# Patient Record
Sex: MALE | Race: WHITE | NOT HISPANIC OR LATINO | Employment: FULL TIME | URBAN - METROPOLITAN AREA
[De-identification: names, ages, dates, MRNs, and addresses within clinical notes are randomized per-mention and may not be internally consistent; named-entity substitution may affect disease eponyms.]

---

## 2023-05-12 ENCOUNTER — OFFICE VISIT (OUTPATIENT)
Dept: PRIMARY CARE | Facility: CLINIC | Age: 28
End: 2023-05-12
Payer: COMMERCIAL

## 2023-05-12 VITALS
BODY MASS INDEX: 25.06 KG/M2 | DIASTOLIC BLOOD PRESSURE: 70 MMHG | SYSTOLIC BLOOD PRESSURE: 130 MMHG | WEIGHT: 185 LBS | HEIGHT: 72 IN | OXYGEN SATURATION: 96 % | TEMPERATURE: 98.1 F | HEART RATE: 88 BPM

## 2023-05-12 DIAGNOSIS — H10.9 BACTERIAL CONJUNCTIVITIS OF BOTH EYES: Primary | ICD-10-CM

## 2023-05-12 DIAGNOSIS — B96.89 BACTERIAL CONJUNCTIVITIS OF BOTH EYES: Primary | ICD-10-CM

## 2023-05-12 PROCEDURE — 99203 OFFICE O/P NEW LOW 30 MIN: CPT

## 2023-05-12 RX ORDER — CIPROFLOXACIN HYDROCHLORIDE 3 MG/ML
1 SOLUTION/ DROPS OPHTHALMIC 4 TIMES DAILY
Qty: 1.4 ML | Refills: 0 | Status: SHIPPED | OUTPATIENT
Start: 2023-05-12 | End: 2023-05-19

## 2023-05-12 NOTE — PROGRESS NOTES
Subjective   Tong Edouard is a 27 y.o. male who presents for Eye drainage (For about a month ) and Sinusitis (For about a month ).  Sinusitis      Pt is coming in for discharge of both eyes that started 4 weeks ago. Pt has been having pale white discharge constantly, every day, since then. Denies any inciting trauma or foreign bodies that he can remember. His coworkers don't wash their hands and they are out with pink eye. Denies any blurring of the vision. It is worse in the morning and he wakes up with his eyes crusted together. Denies any fevers. Denies any other symptoms. He has had pink eye before and this feels like it to him.    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 Denies sexual activity  Meds: none  Surghx: none  Review of Systems   All other systems reviewed and are negative.      Objective   Physical Exam  Vitals reviewed.   Constitutional:       Appearance: Normal appearance.   HENT:      Head: Normocephalic and atraumatic.      Right Ear: External ear normal.      Left Ear: External ear normal.      Nose: Nose normal.      Mouth/Throat:      Mouth: Mucous membranes are moist.   Eyes:      Comments: Erythematous inner eyelids, no noticed drainage at this time.   Cardiovascular:      Rate and Rhythm: Normal rate and regular rhythm.   Pulmonary:      Effort: Pulmonary effort is normal.      Breath sounds: Normal breath sounds.   Musculoskeletal:         General: Normal range of motion.      Cervical back: Normal range of motion and neck supple.   Neurological:      General: No focal deficit present.      Mental Status: He is alert and oriented to person, place, and time.   Psychiatric:         Mood and Affect: Mood normal.         Behavior: Behavior normal.         Assessment/Plan   Problem List Items Addressed This Visit          Infectious/Inflammatory    Bacterial conjunctivitis of both eyes - Primary     Pt coming in with 4 weeks  of eye drainage daily, constantly. Pt denies any other sinus symptoms or upper respiratory issues. Pt denies associated itching. Pt likely having bacterial conjunctivitis of both eyes. Pt to start cipro eye drops 1 drop 4 times daily for 7 days. Pt to follow up if symptoms do not resolve for further evaluation.         Relevant Medications    ciprofloxacin (Ciloxan) 0.3 % ophthalmic solution

## 2023-05-12 NOTE — PROGRESS NOTES
I reviewed with the resident the medical history and the resident’s findings on physical examination.  I discussed with the resident the patient’s diagnosis and concur with the treatment plan as documented in the resident note.     Tarun Mei, DO

## 2023-05-12 NOTE — ASSESSMENT & PLAN NOTE
Pt coming in with 4 weeks of eye drainage daily, constantly. Pt denies any other sinus symptoms or upper respiratory issues. Pt denies associated itching. Pt likely having bacterial conjunctivitis of both eyes. Pt to start cipro eye drops 1 drop 4 times daily for 7 days. Pt to follow up if symptoms do not resolve for further evaluation.

## 2023-05-19 ENCOUNTER — OFFICE VISIT (OUTPATIENT)
Dept: PRIMARY CARE | Facility: CLINIC | Age: 28
End: 2023-05-19
Payer: COMMERCIAL

## 2023-05-19 VITALS
DIASTOLIC BLOOD PRESSURE: 82 MMHG | RESPIRATION RATE: 18 BRPM | SYSTOLIC BLOOD PRESSURE: 133 MMHG | HEART RATE: 85 BPM | WEIGHT: 186 LBS | TEMPERATURE: 98.8 F | OXYGEN SATURATION: 98 % | BODY MASS INDEX: 25.19 KG/M2 | HEIGHT: 72 IN

## 2023-05-19 DIAGNOSIS — J30.9 ALLERGIC CONJUNCTIVITIS OF BOTH EYES AND RHINITIS: Primary | ICD-10-CM

## 2023-05-19 DIAGNOSIS — H10.13 ALLERGIC CONJUNCTIVITIS OF BOTH EYES AND RHINITIS: Primary | ICD-10-CM

## 2023-05-19 PROCEDURE — 1036F TOBACCO NON-USER: CPT

## 2023-05-19 PROCEDURE — 99213 OFFICE O/P EST LOW 20 MIN: CPT

## 2023-05-19 RX ORDER — MINERAL OIL
180 ENEMA (ML) RECTAL DAILY
Qty: 30 TABLET | Refills: 5 | Status: SHIPPED | OUTPATIENT
Start: 2023-05-19 | End: 2024-04-11 | Stop reason: SDUPTHER

## 2023-05-19 RX ORDER — OLOPATADINE HYDROCHLORIDE 1 MG/ML
1 SOLUTION/ DROPS OPHTHALMIC 2 TIMES DAILY PRN
Qty: 5 ML | Refills: 0 | Status: SHIPPED | OUTPATIENT
Start: 2023-05-19 | End: 2024-04-11 | Stop reason: ALTCHOICE

## 2023-05-19 NOTE — ASSESSMENT & PLAN NOTE
Pt complaining of continued crusting in the morning, drainage, and new itching of his BL eyes. Pt also complaining of rhinorrhea and congestion. Denies any new environmental changes. Denies any foreign body sensation or dec in visual acuity so low suspicion for keratoconjunctivitis. Pt does not wear contacts. Pt likely experiencing symptoms 2/2 allergic conjunctivitis given symptoms. Pt to benefit from patanol eye drops BID and allegra daily. Pt instructed to call back if symptoms do not have some resolution.

## 2023-05-19 NOTE — PROGRESS NOTES
I reviewed with the resident the medical history and the resident’s findings on physical examination.  I discussed with the resident the patient’s diagnosis and concur with the treatment plan as documented in the resident note.     Dominic Aponte MD

## 2023-05-19 NOTE — PROGRESS NOTES
Subjective   Jas Edouard is a 27 y.o. male who presents for Eye Drainage (He has had sx x 1 month. He was seen last week.  Not better).  HPI  Pt is coming in today for evaluation of continued eye drainage and crusting in the morning. The drainage is still pussy and thick white, happening about twice a day. He claims this builds up in the corner and he has to pick it out. He has also now complaining of congestion. Denies any cough or any other Upper respiratory symptoms. He has never had issues with seasonal allergies however he notes that his eyes are itching him now. Denies any fevers, chills, N/V, or any other symptoms. Denies any pain anywhere. Does not where contacts. Denies any foreign body sensation or visualization of floaters. Denies any dec in visual acuity.  Denies any environmental or situational changes. No new jobs or animals.    Review of Systems    Objective   Physical Exam    Assessment/Plan   Problem List Items Addressed This Visit          Other    Allergic conjunctivitis of both eyes and rhinitis - Primary     Pt complaining of continued crusting in the morning, drainage, and new itching of his BL eyes. Pt also complaining of rhinorrhea and congestion. Denies any new environmental changes. Denies any foreign body sensation or dec in visual acuity so low suspicion for keratoconjunctivitis. Pt does not wear contacts. Pt likely experiencing symptoms 2/2 allergic conjunctivitis given symptoms. Pt to benefit from patanol eye drops BID and allegra daily. Pt instructed to call back if symptoms do not have some resolution.         Relevant Medications    olopatadine (Patanol) 0.1 % ophthalmic solution    fexofenadine (Allegra) 180 mg tablet

## 2023-06-06 DIAGNOSIS — J30.9 ALLERGIC CONJUNCTIVITIS OF BOTH EYES AND RHINITIS: Primary | ICD-10-CM

## 2023-06-06 DIAGNOSIS — H57.89 EYE DRAINAGE: ICD-10-CM

## 2023-06-06 DIAGNOSIS — H10.13 ALLERGIC CONJUNCTIVITIS OF BOTH EYES AND RHINITIS: Primary | ICD-10-CM

## 2024-03-07 ENCOUNTER — OFFICE VISIT (OUTPATIENT)
Dept: OPHTHALMOLOGY | Facility: CLINIC | Age: 29
End: 2024-03-07
Payer: COMMERCIAL

## 2024-03-07 DIAGNOSIS — H04.123 DRY EYE SYNDROME OF BOTH EYES: ICD-10-CM

## 2024-03-07 DIAGNOSIS — H02.883 MEIBOMIAN GLAND DISEASE OF RIGHT EYE: Primary | ICD-10-CM

## 2024-03-07 DIAGNOSIS — H52.01 HYPEROPIA OF RIGHT EYE WITH ASTIGMATISM: ICD-10-CM

## 2024-03-07 DIAGNOSIS — H52.201 HYPEROPIA OF RIGHT EYE WITH ASTIGMATISM: ICD-10-CM

## 2024-03-07 DIAGNOSIS — H01.02A SQUAMOUS BLEPHARITIS OF UPPER AND LOWER EYELIDS OF BOTH EYES: ICD-10-CM

## 2024-03-07 DIAGNOSIS — H01.02B SQUAMOUS BLEPHARITIS OF UPPER AND LOWER EYELIDS OF BOTH EYES: ICD-10-CM

## 2024-03-07 DIAGNOSIS — H02.889 MEIBOMIAN GLAND DYSFUNCTION: ICD-10-CM

## 2024-03-07 PROCEDURE — 92015 DETERMINE REFRACTIVE STATE: CPT | Performed by: STUDENT IN AN ORGANIZED HEALTH CARE EDUCATION/TRAINING PROGRAM

## 2024-03-07 PROCEDURE — 92014 COMPRE OPH EXAM EST PT 1/>: CPT | Performed by: STUDENT IN AN ORGANIZED HEALTH CARE EDUCATION/TRAINING PROGRAM

## 2024-03-07 RX ORDER — PERFLUOROHEXYLOCTANE 1 MG/MG
1 SOLUTION OPHTHALMIC 4 TIMES DAILY
Qty: 3 ML | Refills: 2 | Status: SHIPPED | OUTPATIENT
Start: 2024-03-07 | End: 2024-04-11 | Stop reason: ALTCHOICE

## 2024-03-07 ASSESSMENT — CONF VISUAL FIELD
OS_SUPERIOR_NASAL_RESTRICTION: 0
OD_NORMAL: 1
METHOD: COUNTING FINGERS
OS_NORMAL: 1
OD_SUPERIOR_NASAL_RESTRICTION: 0
OS_INFERIOR_TEMPORAL_RESTRICTION: 0
OS_SUPERIOR_TEMPORAL_RESTRICTION: 0
OD_SUPERIOR_TEMPORAL_RESTRICTION: 0
OD_INFERIOR_TEMPORAL_RESTRICTION: 0
OD_INFERIOR_NASAL_RESTRICTION: 0
OS_INFERIOR_NASAL_RESTRICTION: 0

## 2024-03-07 ASSESSMENT — VISUAL ACUITY
METHOD: SNELLEN - LINEAR
OD_SC: 20/20
OS_SC: 20/20-

## 2024-03-07 ASSESSMENT — TONOMETRY
OD_IOP_MMHG: 13
IOP_METHOD: GOLDMANN APPLANATION
OS_IOP_MMHG: 17

## 2024-03-07 ASSESSMENT — ENCOUNTER SYMPTOMS
HEMATOLOGIC/LYMPHATIC NEGATIVE: 0
RESPIRATORY NEGATIVE: 0
GASTROINTESTINAL NEGATIVE: 0
ALLERGIC/IMMUNOLOGIC NEGATIVE: 0
MUSCULOSKELETAL NEGATIVE: 0
CARDIOVASCULAR NEGATIVE: 0
CONSTITUTIONAL NEGATIVE: 0
PSYCHIATRIC NEGATIVE: 0
ENDOCRINE NEGATIVE: 0
NEUROLOGICAL NEGATIVE: 0
EYES NEGATIVE: 0

## 2024-03-07 ASSESSMENT — REFRACTION_MANIFEST
OD_CYLINDER: -0.50
OD_SPHERE: +0.25
OS_SPHERE: PLANO
OD_CYLINDER: -0.50
OD_SPHERE: +0.25
OD_AXIS: 170
OS_SPHERE: -0.50
METHOD_AUTOREFRACTION: 1
OD_AXIS: 169

## 2024-03-07 ASSESSMENT — EXTERNAL EXAM - RIGHT EYE: OD_EXAM: NORMAL

## 2024-03-07 ASSESSMENT — EXTERNAL EXAM - LEFT EYE: OS_EXAM: NORMAL

## 2024-03-07 ASSESSMENT — CUP TO DISC RATIO
OS_RATIO: .55
OD_RATIO: .50

## 2024-03-07 NOTE — PROGRESS NOTES
Assessment/Plan   Diagnoses and all orders for this visit:  Meibomian gland dysfunction  Squamous blepharitis of upper and lower eyelids of both eyes    Slightly asymmetric IOP os>od (held lid os), wnl ou. Larger cds however healthy NRR and larger nerves overall. Will monitor 1 year.    Testing to evaluate the presence of dry eye disease (DED) was performed today and provided the following results:  Tear break up time (TBUT), a measure of tear stability (0-5 = severe, 6-10 = moderate, 11-15 = mild)  OD:  6 seconds  OS:  6 seconds  Corneal punctate epithelial erosions (PEE) staining with sodium fluorescein score (5 zones, each PEE level 0-3, maximum score = 15)  OD: NIH PEE score:  0 Central PEE absent  OS: NIH PEE score:  0 Central PEE absent   Meibomian gland disease (Efron scale 0-4, 0: no abnormality, 4: thick creamy yellow expression at all gland orifices, expression continuous, conjunctival redness):  OD: 1  OS: 1    Patient seeing improvement in symptoms however signs are still apparent.   Tx plan: Continue preservative free artificial tears bid-qid ou, continue Cliradex foam BID and warm compresses at bedtime. Discussed options with patient. Start meibo 4x/day.     Recommend starting Miebo (perfluorohexyloctane) qid OU for evaporative dry eye. The patient has tried and failed alternative treatments of artificial tears (lipid based) and warm compresses. This prescription is being prescribed for an FDA approved reason.   Rx sent to [facility] ([facility]).       Hyperopia of right eye with astigmatism  New spec rx released today per patient request. Discussed glasses are not necessary as minimal prescription and visual acuity (VA) uncorrected is od: 20/20, os: 20/20-. Refraction billed today.     RTC 6 mo follow up

## 2024-03-29 ENCOUNTER — OFFICE VISIT (OUTPATIENT)
Dept: PRIMARY CARE | Facility: CLINIC | Age: 29
End: 2024-03-29
Payer: COMMERCIAL

## 2024-03-29 VITALS
BODY MASS INDEX: 24.38 KG/M2 | HEART RATE: 84 BPM | SYSTOLIC BLOOD PRESSURE: 144 MMHG | RESPIRATION RATE: 20 BRPM | TEMPERATURE: 97.8 F | OXYGEN SATURATION: 96 % | DIASTOLIC BLOOD PRESSURE: 82 MMHG | WEIGHT: 180 LBS | HEIGHT: 72 IN

## 2024-03-29 DIAGNOSIS — F41.9 ANXIETY: ICD-10-CM

## 2024-03-29 DIAGNOSIS — F20.89 OTHER SCHIZOPHRENIA (MULTI): Primary | ICD-10-CM

## 2024-03-29 DIAGNOSIS — J01.00 ACUTE MAXILLARY SINUSITIS, RECURRENCE NOT SPECIFIED: ICD-10-CM

## 2024-03-29 PROCEDURE — 1036F TOBACCO NON-USER: CPT

## 2024-03-29 PROCEDURE — 99214 OFFICE O/P EST MOD 30 MIN: CPT

## 2024-03-29 RX ORDER — HYDROXYZINE HYDROCHLORIDE 25 MG/1
1 TABLET, FILM COATED ORAL 3 TIMES DAILY PRN
COMMUNITY
Start: 2024-03-25 | End: 2024-05-23 | Stop reason: WASHOUT

## 2024-03-29 RX ORDER — OLANZAPINE 10 MG/1
TABLET, ORALLY DISINTEGRATING ORAL
COMMUNITY
Start: 2024-03-25 | End: 2024-05-23 | Stop reason: WASHOUT

## 2024-03-29 RX ORDER — OLANZAPINE 20 MG/1
1 TABLET ORAL NIGHTLY
COMMUNITY
Start: 2024-03-25 | End: 2024-04-24

## 2024-03-29 RX ORDER — IPRATROPIUM BROMIDE 21 UG/1
2 SPRAY, METERED NASAL EVERY 12 HOURS
Qty: 30 ML | Refills: 12 | Status: SHIPPED | OUTPATIENT
Start: 2024-03-29 | End: 2024-04-22

## 2024-03-29 RX ORDER — AMOXICILLIN AND CLAVULANATE POTASSIUM 875; 125 MG/1; MG/1
875 TABLET, FILM COATED ORAL 2 TIMES DAILY
Qty: 20 TABLET | Refills: 0 | Status: SHIPPED | OUTPATIENT
Start: 2024-03-29 | End: 2024-04-11 | Stop reason: ALTCHOICE

## 2024-03-29 ASSESSMENT — ENCOUNTER SYMPTOMS
SINUS PAIN: 1
DIZZINESS: 0
CONFUSION: 0
AGITATION: 0
DYSPHORIC MOOD: 0
NAUSEA: 0
FEVER: 0
SHORTNESS OF BREATH: 0
LIGHT-HEADEDNESS: 0
SINUS PRESSURE: 1
VOMITING: 0
HALLUCINATIONS: 0
RHINORRHEA: 1

## 2024-03-29 NOTE — ASSESSMENT & PLAN NOTE
Pt has had symptoms consistent with sinus infection for about 2 weeks. He has ttp at maxillary sinus. Pt given augmentin and atrovent intranasal spray to use. Pt to follow up if symptoms fail to improve. Pt also believes he has deviated septum, ENT referral offered however deferred at this time.

## 2024-03-29 NOTE — ASSESSMENT & PLAN NOTE
Pt recently discharged from hospital for acute psychosis 2/2 schizophrenia. Pt has been stabilized on his current dosing of zyprexa of 5mg AM, 5mg 3:30pm and 20mg nightly. Pt also using atarax as needed for anxiety and feels it is doing well for him. Pt claims he feels like he is getting back to doing the things he enjoys. He does have a therapy appt Monday and one in april that are coming up but does not have a psychiatrist. He was given a referral for one today. He said he does not currently need refills on his medications. Pt instructed on safety and knows to call 911, crisis hotline, or to seek further care should he develop any thoughts of harming self or others or his symptoms recur.

## 2024-03-29 NOTE — PROGRESS NOTES
"Patient: Tong Edouard \"Jas\"  : 1995  PCP: Vinny Gamboa DO  MRN: [id number]  Program: No programs to display       Tong Edouard \"Jas\" is a 28 y.o. male presenting today for follow-up after being discharged from the hospital 5 days ago. The main problem requiring admission was acute psychosis likely 2/2 schizophrenia. The discharge summary was reviewed.     Pt claims he had his inpatient stay at [facility] and he felt like it was very effective for him. He also noticed his sinuses have been acting out for him for quite some time with excess congestion, throat clearing. He went to the urgent care for this before his hospital admission and they told him he had a sinus infection and he should take OTC medications. He has had worsening symptoms since then.     In terms of his hospital stay. He had a panic attack likely caused by his increased stress at work. He had to put together a large shipment and things were not being communicated well which led to his heightening symptoms. He is overall happy with his job. He does have some concern in the back of his head but overall feels well. He is on zyprexa still and feels like it is working well and he does not have any anxiety although talking about work is increasing his anxiety. He does have a therapy appt Monday and another one in April. Pt is open to being set up with a psychiatrist. He feels like communication does help him. Pt will be enjoying his hobbies again soon. He is going back to [place], enjoying his walk in nature, and mowing the lawn. He feels like he sometimes needs something for anxiety and atarax takes the edge off. He is enjoying [employer] music and is going to a concert. He is going with friends. He enjoyed going to [place] with them last time for memorial day. Pt denies any thoughts of self harm or harming others.    Pt was also interested in a back brace for lifting at work. 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               Review of Systems   Constitutional:  Negative for fever.   HENT:  Positive for rhinorrhea, sinus pressure and sinus pain.    Respiratory:  Negative for shortness of breath.    Cardiovascular:  Negative for chest pain.   Gastrointestinal:  Negative for nausea and vomiting.   Neurological:  Negative for dizziness and light-headedness.   Psychiatric/Behavioral:  Negative for agitation, confusion, dysphoric mood, hallucinations, self-injury and suicidal ideas.    All other systems reviewed and are negative.      /82 (BP Location: Right arm, Patient Position: Sitting)   Pulse 84   Temp 36.6 °C (97.8 °F)   Resp 20   Ht 1.829 m (6')   Wt 81.6 kg (180 lb)   SpO2 96%   BMI 24.41 kg/m²     Physical Exam  Vitals reviewed.   Constitutional:       General: He is not in acute distress.     Appearance: Normal appearance. He is not toxic-appearing.   HENT:      Head: Normocephalic and atraumatic.      Nose: Nose normal.   Eyes:      Extraocular Movements: Extraocular movements intact.   Cardiovascular:      Rate and Rhythm: Normal rate and regular rhythm.      Heart sounds: No murmur heard.     No friction rub. No gallop.   Pulmonary:      Effort: Pulmonary effort is normal. No respiratory distress.      Breath sounds: Normal breath sounds. No wheezing, rhonchi or rales.   Skin:     General: Skin is warm and dry.   Neurological:      General: No focal deficit present.      Mental Status: He is alert and oriented to person, place, and time. Mental status is at baseline.   Psychiatric:         Mood and Affect: Mood normal.         Behavior: Behavior normal.         Thought Content: Thought content normal.           Assessment/Plan   Problem List Items Addressed This Visit             ICD-10-CM    Other schizophrenia (CMS/[facility]) - Primary F20.89     Pt recently discharged from hospital for acute psychosis 2/2 schizophrenia. Pt has been stabilized on his current dosing of  zyprexa of 5mg AM, 5mg 3:30pm and 20mg nightly. Pt also using atarax as needed for anxiety and feels it is doing well for him. Pt claims he feels like he is getting back to doing the things he enjoys. He does have a therapy appt Monday and one in april that are coming up but does not have a psychiatrist. He was given a referral for one today. He said he does not currently need refills on his medications. Pt instructed on safety and knows to call 911, crisis hotline, or to seek further care should he develop any thoughts of harming self or others or his symptoms recur.         Relevant Orders    Referral to Psychiatry    Acute maxillary sinusitis J01.00     Pt has had symptoms consistent with sinus infection for about 2 weeks. He has ttp at maxillary sinus. Pt given augmentin and atrovent intranasal spray to use. Pt to follow up if symptoms fail to improve. Pt also believes he has deviated septum, ENT referral offered however deferred at this time.          Relevant Medications    amoxicillin-pot clavulanate (Augmentin) 875-125 mg tablet    ipratropium (Atrovent) 21 mcg (0.03 %) nasal spray     Other Visit Diagnoses         Codes    Anxiety     F41.9    Relevant Orders    Referral to Psychiatry

## 2024-03-29 NOTE — PROGRESS NOTES
I reviewed the resident/fellow's documentation and discussed the patient with the resident/fellow. I agree with the resident/fellow's medical decision making as documented in the note.     Dominic Aponte MD

## 2024-03-29 NOTE — LETTER
March 29, 2024     Patient: Tong Edouard   YOB: 1995   Date of Visit: 3/29/2024       To Whom It May Concern:    Tong Edouard was seen in my clinic on 3/29/2024 at 9:50 am. Pt is to begin using a supportive back brace throughout the day at work for lifting.     If you have any questions or concerns, please don't hesitate to call.         Sincerely,         Vinny Gamboa DO        CC: No Recipients

## 2024-04-03 ENCOUNTER — TELEPHONE (OUTPATIENT)
Dept: PRIMARY CARE | Facility: CLINIC | Age: 29
End: 2024-04-03
Payer: COMMERCIAL

## 2024-04-11 ENCOUNTER — OFFICE VISIT (OUTPATIENT)
Dept: PRIMARY CARE | Facility: CLINIC | Age: 29
End: 2024-04-11
Payer: COMMERCIAL

## 2024-04-11 VITALS
RESPIRATION RATE: 20 BRPM | DIASTOLIC BLOOD PRESSURE: 81 MMHG | SYSTOLIC BLOOD PRESSURE: 124 MMHG | TEMPERATURE: 98.5 F | HEART RATE: 87 BPM | OXYGEN SATURATION: 98 % | HEIGHT: 72 IN | BODY MASS INDEX: 23.84 KG/M2 | WEIGHT: 176 LBS

## 2024-04-11 DIAGNOSIS — J30.9 ALLERGIC CONJUNCTIVITIS OF BOTH EYES AND RHINITIS: ICD-10-CM

## 2024-04-11 DIAGNOSIS — H93.8X9 SENSATION OF FULLNESS IN EAR, UNSPECIFIED LATERALITY: ICD-10-CM

## 2024-04-11 DIAGNOSIS — F41.9 ANXIETY: Primary | ICD-10-CM

## 2024-04-11 DIAGNOSIS — H10.13 ALLERGIC CONJUNCTIVITIS OF BOTH EYES AND RHINITIS: ICD-10-CM

## 2024-04-11 PROCEDURE — 99213 OFFICE O/P EST LOW 20 MIN: CPT

## 2024-04-11 PROCEDURE — 1036F TOBACCO NON-USER: CPT

## 2024-04-11 RX ORDER — MINERAL OIL
180 ENEMA (ML) RECTAL DAILY
Qty: 30 TABLET | Refills: 5 | Status: SHIPPED | OUTPATIENT
Start: 2024-04-11 | End: 2024-10-08

## 2024-04-11 ASSESSMENT — ENCOUNTER SYMPTOMS
DIZZINESS: 0
SHORTNESS OF BREATH: 0
NAUSEA: 0
LIGHT-HEADEDNESS: 0
FEVER: 0
VOMITING: 0

## 2024-04-11 NOTE — PROGRESS NOTES
"Rubin Edouard \"Jas\" is a 28 y.o. male who presents for Earache (Right ear pain x 1 week. ).  Pt presenting for concerns of an event that happened this morning. He was thinking about his work and was thinking if it was right for him. He felt intense anxiety with difficulty breathing for 5 minutes. He took an atarax which did help with his symptoms. He also was using q-tips to clean out his ear. He felt pain with the q-tip when he was pushing into his R ear. He was concerned for his ear being clogged. No other symptoms at this time.     Earache   Pertinent negatives include no vomiting.       Review of Systems   Constitutional:  Negative for fever.   HENT:  Positive for ear pain.    Respiratory:  Negative for shortness of breath.    Cardiovascular:  Negative for chest pain.   Gastrointestinal:  Negative for nausea and vomiting.   Neurological:  Negative for dizziness and light-headedness.   All other systems reviewed and are negative.      Objective   Physical Exam  Vitals reviewed.   Constitutional:       General: He is not in acute distress.     Appearance: Normal appearance. He is not toxic-appearing.   HENT:      Head: Normocephalic and atraumatic.      Right Ear: Tympanic membrane normal.      Left Ear: Tympanic membrane normal.      Nose: Nose normal.   Eyes:      Extraocular Movements: Extraocular movements intact.   Cardiovascular:      Rate and Rhythm: Normal rate and regular rhythm.      Heart sounds: No murmur heard.     No friction rub. No gallop.   Pulmonary:      Effort: Pulmonary effort is normal. No respiratory distress.      Breath sounds: Normal breath sounds. No wheezing, rhonchi or rales.   Skin:     General: Skin is warm and dry.   Neurological:      General: No focal deficit present.      Mental Status: He is alert.   Psychiatric:         Mood and Affect: Mood normal.         Behavior: Behavior normal.         Assessment/Plan   Problem List Items Addressed This Visit             " ICD-10-CM    Sensation of fullness in ear H93.8X9     Pt presenting for ear fullness BL. PE unremarkable which is reassuring. Pt does endorse seasonal allergies and has not been taking his medication daily. Pt to continue with his allegra daily and should continue flonase nightly. Pt to follow up in 2 weeks should symptoms fail to improve.          Anxiety - Primary F41.9     Pt had panic attack in the morning due to nature of work and stress it causes. Denies any accompanying hallucinations. Pt symptoms did resolve with atarax. Pt to continue with coping mechanisms. If symptoms worsen or he gets more frequent attacks pt to follow up. He does have follow up in one month otherwise.         Relevant Orders    Follow Up In [facility] - PCP - Established            Vinny Gamboa,

## 2024-04-11 NOTE — ASSESSMENT & PLAN NOTE
Pt had panic attack in the morning due to nature of work and stress it causes. Denies any accompanying hallucinations. Pt symptoms did resolve with atarax. Pt to continue with coping mechanisms. If symptoms worsen or he gets more frequent attacks pt to follow up. He does have follow up in one month otherwise.

## 2024-04-11 NOTE — LETTER
April 11, 2024     Patient: Tong Edouard   YOB: 1995   Date of Visit: 4/11/2024       To Whom It May Concern:    Tong Edouard was seen in my clinic on 4/11/2024 at 1:40 pm. Please excuse Tong for his absence from work on this day to make the appointment and for his inpatient stay from 3/11/24-3/25/24 for his management of his acute medical condition.    If you have any questions or concerns, please don't hesitate to call.         Sincerely,         Vinny Gamboa DO        CC: No Recipients

## 2024-04-11 NOTE — ASSESSMENT & PLAN NOTE
Pt presenting for ear fullness BL. PE unremarkable which is reassuring. Pt does endorse seasonal allergies and has not been taking his medication daily. Pt to continue with his allegra daily and should continue flonase nightly. Pt to follow up in 2 weeks should symptoms fail to improve.

## 2024-04-16 NOTE — PROGRESS NOTES
I reviewed the resident/fellow's documentation and discussed the patient with the resident/fellow. I agree with the resident/fellow's medical decision making as documented in the note.     Perla Nina MD

## 2024-04-20 DIAGNOSIS — J01.00 ACUTE MAXILLARY SINUSITIS, RECURRENCE NOT SPECIFIED: ICD-10-CM

## 2024-04-22 RX ORDER — IPRATROPIUM BROMIDE 21 UG/1
2 SPRAY, METERED NASAL EVERY 12 HOURS
Qty: 30 ML | Refills: 5 | Status: SHIPPED | OUTPATIENT
Start: 2024-04-22 | End: 2025-04-22

## 2024-05-09 ENCOUNTER — APPOINTMENT (OUTPATIENT)
Dept: PRIMARY CARE | Facility: CLINIC | Age: 29
End: 2024-05-09
Payer: COMMERCIAL

## 2024-05-23 ENCOUNTER — OFFICE VISIT (OUTPATIENT)
Dept: PRIMARY CARE | Facility: CLINIC | Age: 29
End: 2024-05-23
Payer: COMMERCIAL

## 2024-05-23 VITALS
RESPIRATION RATE: 16 BRPM | WEIGHT: 178 LBS | BODY MASS INDEX: 24.14 KG/M2 | HEART RATE: 69 BPM | SYSTOLIC BLOOD PRESSURE: 113 MMHG | OXYGEN SATURATION: 96 % | DIASTOLIC BLOOD PRESSURE: 68 MMHG | TEMPERATURE: 99 F

## 2024-05-23 DIAGNOSIS — F20.89 OTHER SCHIZOPHRENIA (MULTI): Primary | ICD-10-CM

## 2024-05-23 PROCEDURE — 99214 OFFICE O/P EST MOD 30 MIN: CPT

## 2024-05-23 RX ORDER — CLOZAPINE 25 MG/1
75 TABLET ORAL NIGHTLY
COMMUNITY
Start: 2024-05-21

## 2024-05-23 RX ORDER — PROPRANOLOL HYDROCHLORIDE 10 MG/1
10 TABLET ORAL 2 TIMES DAILY PRN
COMMUNITY
Start: 2024-05-06 | End: 2024-06-22

## 2024-05-23 ASSESSMENT — ENCOUNTER SYMPTOMS
LIGHT-HEADEDNESS: 0
SHORTNESS OF BREATH: 0
FEVER: 0
VOMITING: 0
NAUSEA: 0
DIZZINESS: 0

## 2024-05-23 NOTE — PROGRESS NOTES
"Rubin Edouard \"Jas\" is a 28 y.o. male who presents for Follow-up (Pt here today to F/U for anxiety and right ear).  Pt presenting for follow up after second hospitalization for acute psychosis 2/2 schizophrenia. Pt was admitted for over a week and was discharged on May 6th. Since then pt has been doing well on his new medication regiment of clozapine and propranolol. Pt is on a reduced work schedule of 5hours daily for now until he feels ready to do more. He did see his psychiatrist today and records were reviewed. No other issues at this time.         Review of Systems   Constitutional:  Negative for fever.   Respiratory:  Negative for shortness of breath.    Cardiovascular:  Negative for chest pain.   Gastrointestinal:  Negative for nausea and vomiting.   Neurological:  Negative for dizziness and light-headedness.   All other systems reviewed and are negative.      Objective   Physical Exam  Vitals reviewed.   Constitutional:       General: He is not in acute distress.     Appearance: Normal appearance. He is not toxic-appearing.   HENT:      Head: Normocephalic and atraumatic.      Nose: Nose normal.   Eyes:      Extraocular Movements: Extraocular movements intact.   Cardiovascular:      Rate and Rhythm: Normal rate and regular rhythm.      Heart sounds: No murmur heard.     No friction rub. No gallop.   Pulmonary:      Effort: Pulmonary effort is normal. No respiratory distress.      Breath sounds: Normal breath sounds. No wheezing, rhonchi or rales.   Skin:     General: Skin is warm and dry.   Neurological:      General: No focal deficit present.      Mental Status: He is alert.   Psychiatric:         Mood and Affect: Mood normal.         Behavior: Behavior normal.         Assessment/Plan   Problem List Items Addressed This Visit             ICD-10-CM    Other schizophrenia (Multi) - Primary F20.89     Pt recently discharged from hospital for second episode of acute psychosis 2/2 schizophrenia. " Pt had medication changed to clozapine and propranolol. He has had no issues or hallucinations since his discharge. He did see psychiatry today and charts were reviewed. He has another appt in August. Recommended pt follow up with them in one month and then follow up here after. Pt knows to call 911, crisis hotline, or to seek further care should he develop any thoughts of harming self or others or his symptoms recur.                 Vinny Gamboa, DO

## 2024-05-23 NOTE — ASSESSMENT & PLAN NOTE
Pt recently discharged from hospital for second episode of acute psychosis 2/2 schizophrenia. Pt had medication changed to clozapine and propranolol. He has had no issues or hallucinations since his discharge. He did see psychiatry today and charts were reviewed. He has another appt in August. Recommended pt follow up with them in one month and then follow up here after. Pt knows to call 911, crisis hotline, or to seek further care should he develop any thoughts of harming self or others or his symptoms recur.

## 2024-09-19 ENCOUNTER — APPOINTMENT (OUTPATIENT)
Dept: OPHTHALMOLOGY | Facility: CLINIC | Age: 29
End: 2024-09-19
Payer: COMMERCIAL

## 2024-09-19 DIAGNOSIS — H01.02A SQUAMOUS BLEPHARITIS OF UPPER AND LOWER EYELIDS OF BOTH EYES: Primary | ICD-10-CM

## 2024-09-19 DIAGNOSIS — H02.889 MEIBOMIAN GLAND DYSFUNCTION: ICD-10-CM

## 2024-09-19 DIAGNOSIS — H01.02B SQUAMOUS BLEPHARITIS OF UPPER AND LOWER EYELIDS OF BOTH EYES: Primary | ICD-10-CM

## 2024-09-19 PROCEDURE — 99213 OFFICE O/P EST LOW 20 MIN: CPT | Performed by: STUDENT IN AN ORGANIZED HEALTH CARE EDUCATION/TRAINING PROGRAM

## 2024-09-19 ASSESSMENT — CONF VISUAL FIELD
OS_SUPERIOR_NASAL_RESTRICTION: 0
OD_INFERIOR_TEMPORAL_RESTRICTION: 0
OS_INFERIOR_NASAL_RESTRICTION: 0
OS_NORMAL: 1
OD_SUPERIOR_NASAL_RESTRICTION: 0
OD_INFERIOR_NASAL_RESTRICTION: 0
OD_SUPERIOR_TEMPORAL_RESTRICTION: 0
OS_INFERIOR_TEMPORAL_RESTRICTION: 0
OS_SUPERIOR_TEMPORAL_RESTRICTION: 0
OD_NORMAL: 1

## 2024-09-19 ASSESSMENT — EXTERNAL EXAM - LEFT EYE: OS_EXAM: NORMAL

## 2024-09-19 ASSESSMENT — ENCOUNTER SYMPTOMS
ALLERGIC/IMMUNOLOGIC NEGATIVE: 0
HEMATOLOGIC/LYMPHATIC NEGATIVE: 0
PSYCHIATRIC NEGATIVE: 1
ENDOCRINE NEGATIVE: 0
CONSTITUTIONAL NEGATIVE: 0
MUSCULOSKELETAL NEGATIVE: 0
RESPIRATORY NEGATIVE: 0
GASTROINTESTINAL NEGATIVE: 0
NEUROLOGICAL NEGATIVE: 0
CARDIOVASCULAR NEGATIVE: 0
EYES NEGATIVE: 0

## 2024-09-19 ASSESSMENT — VISUAL ACUITY
OD_SC: 20/20
OS_SC: 20/20
METHOD: SNELLEN - LINEAR
OS_SC+: -2

## 2024-09-19 ASSESSMENT — EXTERNAL EXAM - RIGHT EYE: OD_EXAM: NORMAL

## 2024-09-19 NOTE — PROGRESS NOTES
Assessment/Plan   Diagnoses and all orders for this visit:  Squamous blepharitis of upper and lower eyelids of both eyes  Meibomian gland dysfunction  -signs and symptoms doing well with current TXT: Systane Tears and Cliradex Foam  -previously Rx'd Miebo but patient has discontinued due to burning  -okay to continue with current TXT    RTC 6 months for annual with DFE, MRX, OCT RNFL (slightly asymmetric C/D noted at last visit)

## 2025-03-20 ENCOUNTER — APPOINTMENT (OUTPATIENT)
Dept: OPHTHALMOLOGY | Facility: CLINIC | Age: 30
End: 2025-03-20
Payer: COMMERCIAL

## 2025-04-03 ENCOUNTER — APPOINTMENT (OUTPATIENT)
Dept: OPHTHALMOLOGY | Facility: CLINIC | Age: 30
End: 2025-04-03
Payer: COMMERCIAL

## 2025-04-03 DIAGNOSIS — H01.02A SQUAMOUS BLEPHARITIS OF UPPER AND LOWER EYELIDS OF BOTH EYES: ICD-10-CM

## 2025-04-03 DIAGNOSIS — H02.88B MEIBOMIAN GLAND DYSFUNCTION (MGD) OF UPPER AND LOWER LIDS OF BOTH EYES: ICD-10-CM

## 2025-04-03 DIAGNOSIS — H02.88A MEIBOMIAN GLAND DYSFUNCTION (MGD) OF UPPER AND LOWER LIDS OF BOTH EYES: ICD-10-CM

## 2025-04-03 DIAGNOSIS — H01.02B SQUAMOUS BLEPHARITIS OF UPPER AND LOWER EYELIDS OF BOTH EYES: ICD-10-CM

## 2025-04-03 DIAGNOSIS — H52.223 REGULAR ASTIGMATISM OF BOTH EYES: Primary | ICD-10-CM

## 2025-04-03 PROBLEM — H10.13 ALLERGIC CONJUNCTIVITIS OF BOTH EYES AND RHINITIS: Status: RESOLVED | Noted: 2023-05-19 | Resolved: 2025-04-03

## 2025-04-03 PROBLEM — H10.9 BACTERIAL CONJUNCTIVITIS OF BOTH EYES: Status: RESOLVED | Noted: 2023-05-12 | Resolved: 2025-04-03

## 2025-04-03 PROBLEM — J30.9 ALLERGIC CONJUNCTIVITIS OF BOTH EYES AND RHINITIS: Status: RESOLVED | Noted: 2023-05-19 | Resolved: 2025-04-03

## 2025-04-03 PROBLEM — H52.201 HYPEROPIA OF RIGHT EYE WITH ASTIGMATISM: Status: RESOLVED | Noted: 2024-03-07 | Resolved: 2025-04-03

## 2025-04-03 PROBLEM — B96.89 BACTERIAL CONJUNCTIVITIS OF BOTH EYES: Status: RESOLVED | Noted: 2023-05-12 | Resolved: 2025-04-03

## 2025-04-03 PROBLEM — H52.01 HYPEROPIA OF RIGHT EYE WITH ASTIGMATISM: Status: RESOLVED | Noted: 2024-03-07 | Resolved: 2025-04-03

## 2025-04-03 PROCEDURE — 92014 COMPRE OPH EXAM EST PT 1/>: CPT | Performed by: STUDENT IN AN ORGANIZED HEALTH CARE EDUCATION/TRAINING PROGRAM

## 2025-04-03 RX ORDER — VIT C/E/ZN/COPPR/LUTEIN/ZEAXAN 250MG-90MG
500 CAPSULE ORAL
COMMUNITY
Start: 2024-05-07

## 2025-04-03 RX ORDER — PERFLUOROHEXYLOCTANE 1 MG/MG
SOLUTION OPHTHALMIC
COMMUNITY
Start: 2024-04-11

## 2025-04-03 RX ORDER — ERGOCALCIFEROL 1.25 MG/1
1 CAPSULE ORAL
COMMUNITY
Start: 2024-05-09

## 2025-04-03 RX ORDER — FLUTICASONE PROPIONATE 50 MCG
SPRAY, SUSPENSION (ML) NASAL
COMMUNITY
Start: 2024-05-06

## 2025-04-03 ASSESSMENT — ENCOUNTER SYMPTOMS
MUSCULOSKELETAL NEGATIVE: 0
NEUROLOGICAL NEGATIVE: 0
HEMATOLOGIC/LYMPHATIC NEGATIVE: 0
ENDOCRINE NEGATIVE: 0
CONSTITUTIONAL NEGATIVE: 0
EYES NEGATIVE: 1
PSYCHIATRIC NEGATIVE: 0
CARDIOVASCULAR NEGATIVE: 0
RESPIRATORY NEGATIVE: 0
GASTROINTESTINAL NEGATIVE: 0
ALLERGIC/IMMUNOLOGIC NEGATIVE: 0

## 2025-04-03 ASSESSMENT — VISUAL ACUITY
METHOD: SNELLEN - LINEAR
OD_SC: 20/20
OS_SC: 20/20

## 2025-04-03 ASSESSMENT — REFRACTION_MANIFEST
OS_AXIS: 120
OS_CYLINDER: -0.25
OS_SPHERE: -1.00
OD_SPHERE: PLANO
OD_AXIS: 165
OD_SPHERE: -0.75
OS_CYLINDER: -0.50
OD_CYLINDER: -0.50
OS_AXIS: 015
OD_AXIS: 160
OD_CYLINDER: -0.25
OS_SPHERE: -0.50

## 2025-04-03 ASSESSMENT — CONF VISUAL FIELD
OS_NORMAL: 1
OS_SUPERIOR_NASAL_RESTRICTION: 0
OD_SUPERIOR_NASAL_RESTRICTION: 0
OS_SUPERIOR_TEMPORAL_RESTRICTION: 0
OD_INFERIOR_TEMPORAL_RESTRICTION: 0
OS_INFERIOR_NASAL_RESTRICTION: 0
OD_NORMAL: 1
OD_SUPERIOR_TEMPORAL_RESTRICTION: 0
OD_INFERIOR_NASAL_RESTRICTION: 0
METHOD: COUNTING FINGERS
OS_INFERIOR_TEMPORAL_RESTRICTION: 0

## 2025-04-03 ASSESSMENT — EXTERNAL EXAM - LEFT EYE: OS_EXAM: NORMAL

## 2025-04-03 ASSESSMENT — TONOMETRY
IOP_METHOD: TONOPEN
OD_IOP_MMHG: 12
OS_IOP_MMHG: 12

## 2025-04-03 ASSESSMENT — CUP TO DISC RATIO
OS_RATIO: .45
OD_RATIO: .40

## 2025-04-03 ASSESSMENT — EXTERNAL EXAM - RIGHT EYE: OD_EXAM: NORMAL

## 2025-04-03 NOTE — PROGRESS NOTES
Assessment/Plan   Diagnoses and all orders for this visit:  Squamous blepharitis of upper and lower eyelids of both eyes  Meibomian gland dysfunction  -signs and symptoms doing well with current TXT: Systane Tears and Cliradex Foam  -previously Rx'd Miebo but patient has discontinued due to burning  -okay to continue with current TXT  Regular Astigmatism Bilateral  -essentially emmetropic-okay to continue without specs    RTC 1 year for annual with TIMMY TOSCANO

## 2026-04-30 ENCOUNTER — APPOINTMENT (OUTPATIENT)
Dept: OPHTHALMOLOGY | Facility: CLINIC | Age: 31
End: 2026-04-30
Payer: COMMERCIAL